# Patient Record
Sex: FEMALE | Race: OTHER
[De-identification: names, ages, dates, MRNs, and addresses within clinical notes are randomized per-mention and may not be internally consistent; named-entity substitution may affect disease eponyms.]

---

## 2018-03-22 ENCOUNTER — HOSPITAL ENCOUNTER (EMERGENCY)
Dept: HOSPITAL 80 - FED | Age: 23
Discharge: HOME | End: 2018-03-22
Payer: MEDICAID

## 2018-03-22 VITALS — RESPIRATION RATE: 18 BRPM | TEMPERATURE: 100 F

## 2018-03-22 VITALS — HEART RATE: 105 BPM | OXYGEN SATURATION: 98 % | DIASTOLIC BLOOD PRESSURE: 68 MMHG | SYSTOLIC BLOOD PRESSURE: 98 MMHG

## 2018-03-22 DIAGNOSIS — E86.9: ICD-10-CM

## 2018-03-22 DIAGNOSIS — J03.90: Primary | ICD-10-CM

## 2018-03-22 NOTE — EDPHY
H & P


Time Seen by Provider: 03/22/18 21:30


HPI/ROS: 





Chief complaint:  Cold symptoms





History of present illness:  This is a 22-year-old female who presents to the 

emergency department for cold symptoms.  She has been sick for the last 2 days.

  She primarily reports sore throat.  She has also had runny nose, nasal 

congestion, slight nonproductive cough and generalized malaise.  No fever, no 

chest congestion or shortness breath, no body aches, no rash.


Smoking Status: Never smoked


Physical Exam: 





General Appearance: Alert, nontoxic.


Eyes: Pupils equal and round no injection.


ENT:  Tympanic membranes, external auditory canals, external ears and 

surrounding soft tissue including over the mastoids are unremarkable.  

Nasopharynx is not injected.  There is clear rhinorrhea.  Oropharynx is mildly 

injected.  Bilateral, symmetrical, tonsillar hypertrophy.  Exudate on the 

tonsils. There is no asymmetry.  The uvula is midline. No elevation of the 

tongue.  There is no hoarseness, no drooling, no trismus, no stridor.


Respiratory: Chest is non tender, lungs are clear to auscultation.


Cardiac: regular rate and rhythm


Musculoskeletal: Neck is supple and non tender. Extremities have full range of 

motion and are non tender.


Skin:  No rashes or lesions.





Constitutional: 


 Initial Vital Signs











Temperature (C)  37.8 C   03/22/18 20:57


 


Heart Rate  123 H  03/22/18 20:57


 


Respiratory Rate  18   03/22/18 20:57


 


Blood Pressure  125/85 H  03/22/18 20:57


 


O2 Sat (%)  94   03/22/18 20:57








 











O2 Delivery Mode               Room Air














Allergies/Adverse Reactions: 


 





No Known Allergies Allergy (Verified 03/22/18 21:00)


 








Home Medications: 














 Medication  Instructions  Recorded


 


Amoxicillin Trihydrate [Amoxil] 500 mg PO TID 10 Days  cap 03/22/18














MDM/Departure





- MDM


Medications Given: 


 








Discontinued Medications





Dexamethasone (Decadron Injection)  10 mg IVP EDNOW ONE


   Stop: 03/22/18 21:17


   Last Admin: 03/22/18 21:33 Dose:  10 mg


Sodium Chloride (Ns)  1,000 mls @ 0 mls/hr IV ONCE ONE


   PRN Reason: Wide Open


   Stop: 03/22/18 21:17


   Last Admin: 03/22/18 21:17 Dose:  1,000 mls


Sodium Chloride (Ns)  1,000 mls @ 0 mls/hr IV EDNOW ONE; Wide Open


   PRN Reason: Protocol


   Stop: 03/22/18 21:17


   Last Admin: 03/22/18 21:34 Dose:  1,000 mls


Ibuprofen (Motrin)  600 mg PO EDNOW ONE


   Stop: 03/22/18 21:17


   Last Admin: 03/22/18 21:18 Dose:  600 mg





ED Course/Re-evaluation: 





Patient seen under the supervision of my secondary supervising physician Dr. Jalil Aguila.  Patient presents to the emergency department for cold symptoms.  

She appears to have an acute tonsillitis.  No evidence of complications such as 

abscess formation, meningitis.  I believe she is appropriate for discharge 

home.  Home care is discussed.  Return precautions are given.  Patient voiced 

understanding and agreement with plan.


Differential Diagnosis: 





Included but not limited tonsillitis, pharyngitis, abscess formation





- Depart


Disposition: Home, Routine, Self-Care


Clinical Impression: 


 Tonsillitis





Condition: Good


Instructions:  Tonsillitis (ED)


Additional Instructions: 


Follow-up with her primary care doctor for recheck within the next week





Take the antibiotics as prescribed until finished even feeling better





Use ibuprofen 600 mg 3 times a day for the next 2-3 days for pain





If symptoms worsen or new symptoms develop return to the emergency room for 

recheck


Prescriptions: 


Amoxicillin Trihydrate [Amoxil] 500 mg PO TID 10 Days  cap


Referrals: 


Oleg Villegas MD [Primary Care Provider] - As per Instructions

## 2018-04-14 ENCOUNTER — HOSPITAL ENCOUNTER (EMERGENCY)
Dept: HOSPITAL 80 - FED | Age: 23
Discharge: HOME | End: 2018-04-14
Payer: MEDICAID

## 2018-04-14 VITALS — DIASTOLIC BLOOD PRESSURE: 78 MMHG | SYSTOLIC BLOOD PRESSURE: 132 MMHG

## 2018-04-14 DIAGNOSIS — L03.311: Primary | ICD-10-CM

## 2018-04-14 NOTE — EDPHY
H & P


Time Seen by Provider: 18 15:57


HPI/ROS: 





HPI


Pain and swelling around belly button.





22-year-old female by private vehicle with her .  The patient complains 

of redness, pain, swelling and some drainage of purulent fluid associated with 

her umbilicus.  She reports that she was at a baseball game on Thursday.  She 

reached over awkwardly to grab her daughter.  She 1st noticed some discomfort 

to the area at that time.  It is possible that the waistband of her jeraul dug 

into our scratched the area of her umbilicus at this time.  Since then she has 

developed worsening pain and now has some swelling and erythema to the area.  

She reports that since yesterday she has had some intermittent purulent 

drainage as well.  She denies fever.  No other complaints.





ROS:





Constitutional:  No fever, no chills.  No weakness.


Eyes:  No discharge.  No changes in vision.


ENT:  No sore throat.  No nasal congestion or rhinorrhea.


Respiratory:  No cough.  No shortness of breath.


Cardiac:  No chest pain, no palpitations.


Gastrointestinal:  As above, no vomiting, no diarrhea.


Genitourinary:  No hematuria.  No dysuria or increased frequency with urination.


Musculoskeletal:  No back pain.  No neck pain.  No myalgias or arthralgias.


Skin:  As above.  No other rashes.


Neurological:  No headache.  No focal weakness or altered sensation.





Past medical history:   with history of ectopic pregnancy.  Recent 

diagnosis and treatment for vaginosis.





Social history:  Here with her  and daughter.





Physical Exam:





General Appearance:  Alert, pleasant 22-year-old female, no distress.  This 

patient is responding to questions appropriately and in full sentences.  This 

patient appears well-hydrated and well-nourished.


Eyes:  Pupils equal and round no pallor or injection.  No lid edema, erythema 

or injection.


Gastrointestinal:  Abdomen is soft with a faint, superficial erythema spreading 

around the umbilicus.  Within the umbilicus there is some mild swelling of the 

tissues with some residual dried purulent fluid, no fluctuance, no palpable 

abscess or induration, no crepitus, the area is moderately tender on palpation, 

otherwise no masses, bowel sounds normal.  No focal tenderness at McBurney's 

point.  No Monk sign.


Neurological:  Motor sensory function is grossly intact.  Cranial nerves are 

normal.  Gait is normal.


Skin:  Warm and dry, no rashes.


Extremities are symmetrical.  All joints range without pain or impingement.


Psychiatric:  No agitation.  No depression.





Database:





EKG:





Imaging:





Procedures:





Emergency department course:





Vital signs reviewed and are normal.  After my evaluation I discussed treatment 

for a cellulitis associated with her emboli cusp.  I will start her on both 

Augmentin and Bactrim DS in the emergency department.  She was also given 600 

mg of ibuprofen.  Her medication allergies were reviewed.  Plan will be to 

prescribe her these medications to be taken over the next 7 days.  I demarcated 

the area of erythema.  I discussed strict return to emergency department 

precautions with her .  We also photograph the area with her  

should she return to the emergency department this will be a reference.  She is 

in agreement with this plan.  She will follow up with the primary care 

physician on Monday of this week, Dr. Villegas at [facility].  All of her 

questions were answered.  She was discharged from the emergency department in 

good condition with her .





Differential Diagnosis:





The differential diagnosis on this patient includes but is not limited to 

cellulitis of the umbilical area.  Drainable abscess, necrotizing fasciitis 

unlikely.  This represents a partial list of diagnoses considered.  These 

considerations are based on history, physical exam, past history, reassessment 

and diagnostic testing.


Smoking Status: Never smoked


Constitutional: 





 Initial Vital Signs











Temperature (C)  36.9 C   18 15:05


 


Heart Rate  95   18 15:05


 


Respiratory Rate  16   18 15:05


 


Blood Pressure  134/87 H  18 15:05


 


O2 Sat (%)  98   18 15:05








 











O2 Delivery Mode               Room Air














Allergies/Adverse Reactions: 


 





No Known Allergies Allergy (Verified 18 15:04)


 








Home Medications: 














 Medication  Instructions  Recorded


 


Amoxicillin/Clavulanate Pot 875 mg PO BID 7 Days  tab 18





[Augmentin 875 mg tab]  


 


Sulfamethox/Tmp 800/160 mg 1 tab PO BID@1000,2200 #14 tab 18





[Bactrim Ds]  














Departure





- Departure


Disposition: Home, Routine, Self-Care


Clinical Impression: 


 Abdominal wall cellulitis





Condition: Good


Instructions:  Cellulitis (ED)


Additional Instructions: 


Read and follow provided instructions.





Follow-up with your primary care physician, Dr. Oleg Villegas, at [facility] on Monday for re-evaluation as discussed.





Take medication exact as prescribed through entire course of treatment.





Ibuprofen dosin mg every 6 hours with meals for the next 3 days only.  

Take only as needed for pain.





Apply warm compresses to the affected area every 2 hours for 10 min as 

discussed.





Return to the emergency department for worsening symptoms, worsening pain, fever

, spreading of the redness that I outlined or other serious concerns.


Referrals: 


Oleg Villegas MD [Primary Care Provider] - As per Instructions


Prescriptions: 


Amoxicillin/Clavulanate Pot [Augmentin 875 mg tab] 875 mg PO BID 7 Days  tab


Sulfamethox/Tmp 800/160 mg [Bactrim Ds] 1 tab PO BID@1000,2200 #14 tab